# Patient Record
Sex: MALE | Race: WHITE | NOT HISPANIC OR LATINO | URBAN - METROPOLITAN AREA
[De-identification: names, ages, dates, MRNs, and addresses within clinical notes are randomized per-mention and may not be internally consistent; named-entity substitution may affect disease eponyms.]

---

## 2023-03-01 ENCOUNTER — TELEPHONE (OUTPATIENT)
Dept: TRANSPLANT | Facility: CLINIC | Age: 73
End: 2023-03-01

## 2023-04-28 ENCOUNTER — TELEPHONE (OUTPATIENT)
Dept: TRANSPLANT | Facility: CLINIC | Age: 73
End: 2023-04-28
Payer: MEDICARE

## 2023-05-03 DIAGNOSIS — Z76.82 ORGAN TRANSPLANT CANDIDATE: Primary | ICD-10-CM

## 2023-05-05 ENCOUNTER — TELEPHONE (OUTPATIENT)
Dept: TRANSPLANT | Facility: CLINIC | Age: 73
End: 2023-05-05
Payer: MEDICARE

## 2023-06-06 ENCOUNTER — HOSPITAL ENCOUNTER (OUTPATIENT)
Dept: RADIOLOGY | Facility: HOSPITAL | Age: 73
Discharge: HOME | End: 2023-06-06
Payer: MEDICARE

## 2023-06-06 ENCOUNTER — OFFICE VISIT (OUTPATIENT)
Dept: TRANSPLANT | Facility: CLINIC | Age: 73
End: 2023-06-06
Payer: MEDICARE

## 2023-06-06 VITALS
HEIGHT: 70 IN | BODY MASS INDEX: 28.15 KG/M2 | DIASTOLIC BLOOD PRESSURE: 80 MMHG | HEART RATE: 64 BPM | WEIGHT: 196.63 LBS | RESPIRATION RATE: 16 BRPM | SYSTOLIC BLOOD PRESSURE: 183 MMHG | TEMPERATURE: 97 F | OXYGEN SATURATION: 99 %

## 2023-06-06 DIAGNOSIS — Z76.82 ORGAN TRANSPLANT CANDIDATE: ICD-10-CM

## 2023-06-06 DIAGNOSIS — I25.10 CORONARY ARTERY DISEASE INVOLVING NATIVE CORONARY ARTERY OF NATIVE HEART WITHOUT ANGINA PECTORIS: ICD-10-CM

## 2023-06-06 DIAGNOSIS — Z71.85 IMMUNIZATION COUNSELING: ICD-10-CM

## 2023-06-06 DIAGNOSIS — E78.5 HYPERLIPIDEMIA, UNSPECIFIED HYPERLIPIDEMIA TYPE: ICD-10-CM

## 2023-06-06 DIAGNOSIS — Z01.818 PRE-TRANSPLANT EVALUATION FOR KIDNEY TRANSPLANT: Primary | ICD-10-CM

## 2023-06-06 DIAGNOSIS — E10.21 TYPE 1 DIABETES MELLITUS WITH DIABETIC NEPHROPATHY: ICD-10-CM

## 2023-06-06 DIAGNOSIS — I10 PRIMARY HYPERTENSION: ICD-10-CM

## 2023-06-06 DIAGNOSIS — N18.5 STAGE 5 CHRONIC KIDNEY DISEASE: ICD-10-CM

## 2023-06-06 PROBLEM — N18.9 ANEMIA IN CHRONIC KIDNEY DISEASE: Status: ACTIVE | Noted: 2021-01-14

## 2023-06-06 PROBLEM — D63.1 ANEMIA IN CHRONIC KIDNEY DISEASE: Status: ACTIVE | Noted: 2021-01-14

## 2023-06-06 PROBLEM — E03.9 HYPOTHYROIDISM: Status: ACTIVE | Noted: 2021-06-22

## 2023-06-06 PROBLEM — I50.32 CHRONIC DIASTOLIC CONGESTIVE HEART FAILURE: Status: ACTIVE | Noted: 2021-01-14

## 2023-06-06 PROCEDURE — 76770 US EXAM ABDO BACK WALL COMP: CPT | Mod: TC,TXP

## 2023-06-06 PROCEDURE — 99215 OFFICE O/P EST HI 40 MIN: CPT | Mod: PBBFAC,25,TXP | Performed by: NURSE PRACTITIONER

## 2023-06-06 PROCEDURE — 71046 XR CHEST PA AND LATERAL: ICD-10-PCS | Mod: 26,TXP,, | Performed by: RADIOLOGY

## 2023-06-06 PROCEDURE — 99244 PR OFFICE CONSULTATION,LEVEL IV: ICD-10-PCS | Mod: S$PBB,TXP,, | Performed by: SURGERY

## 2023-06-06 PROCEDURE — 99205 PR OFFICE/OUTPT VISIT, NEW, LEVL V, 60-74 MIN: ICD-10-PCS | Mod: S$PBB,TXP,, | Performed by: NURSE PRACTITIONER

## 2023-06-06 PROCEDURE — 72170 X-RAY EXAM OF PELVIS: CPT | Mod: 26,TXP,, | Performed by: RADIOLOGY

## 2023-06-06 PROCEDURE — 72170 X-RAY EXAM OF PELVIS: CPT | Mod: TC,TXP

## 2023-06-06 PROCEDURE — 99999 PR PBB SHADOW E&M-EST. PATIENT-LVL V: ICD-10-PCS | Mod: PBBFAC,TXP,, | Performed by: NURSE PRACTITIONER

## 2023-06-06 PROCEDURE — 71046 X-RAY EXAM CHEST 2 VIEWS: CPT | Mod: TC,TXP

## 2023-06-06 PROCEDURE — 76770 US RETROPERITONEAL COMPLETE: ICD-10-PCS | Mod: 26,TXP,, | Performed by: INTERNAL MEDICINE

## 2023-06-06 PROCEDURE — 97802 MEDICAL NUTRITION INDIV IN: CPT | Mod: PBBFAC,TXP

## 2023-06-06 PROCEDURE — 93978 US DOPP ILIACS BILATERAL: ICD-10-PCS | Mod: 26,TXP,, | Performed by: INTERNAL MEDICINE

## 2023-06-06 PROCEDURE — 99203 OFFICE O/P NEW LOW 30 MIN: CPT | Mod: S$PBB,TXP,, | Performed by: STUDENT IN AN ORGANIZED HEALTH CARE EDUCATION/TRAINING PROGRAM

## 2023-06-06 PROCEDURE — 99205 OFFICE O/P NEW HI 60 MIN: CPT | Mod: S$PBB,TXP,, | Performed by: NURSE PRACTITIONER

## 2023-06-06 PROCEDURE — 99999 PR PBB SHADOW E&M-EST. PATIENT-LVL V: CPT | Mod: PBBFAC,TXP,, | Performed by: NURSE PRACTITIONER

## 2023-06-06 PROCEDURE — 72170 XR PELVIS ROUTINE AP: ICD-10-PCS | Mod: 26,TXP,, | Performed by: RADIOLOGY

## 2023-06-06 PROCEDURE — 71046 X-RAY EXAM CHEST 2 VIEWS: CPT | Mod: 26,TXP,, | Performed by: RADIOLOGY

## 2023-06-06 PROCEDURE — 93978 VASCULAR STUDY: CPT | Mod: 26,TXP,, | Performed by: INTERNAL MEDICINE

## 2023-06-06 PROCEDURE — 93978 VASCULAR STUDY: CPT | Mod: TC,TXP

## 2023-06-06 PROCEDURE — 99244 OFF/OP CNSLTJ NEW/EST MOD 40: CPT | Mod: S$PBB,TXP,, | Performed by: SURGERY

## 2023-06-06 PROCEDURE — 76770 US EXAM ABDO BACK WALL COMP: CPT | Mod: 26,TXP,, | Performed by: INTERNAL MEDICINE

## 2023-06-06 PROCEDURE — 99203 PR OFFICE/OUTPT VISIT, NEW, LEVL III, 30-44 MIN: ICD-10-PCS | Mod: S$PBB,TXP,, | Performed by: STUDENT IN AN ORGANIZED HEALTH CARE EDUCATION/TRAINING PROGRAM

## 2023-06-06 RX ORDER — INSULIN LISPRO 100 [IU]/ML
10 INJECTION, SOLUTION INTRAVENOUS; SUBCUTANEOUS
COMMUNITY
Start: 2023-05-04

## 2023-06-06 RX ORDER — CALCITRIOL 0.25 UG/1
0.25 CAPSULE ORAL DAILY
COMMUNITY
Start: 2023-03-04

## 2023-06-06 RX ORDER — ASPIRIN 81 MG/1
81 TABLET ORAL DAILY
COMMUNITY

## 2023-06-06 RX ORDER — FUROSEMIDE 80 MG/1
80 TABLET ORAL DAILY
COMMUNITY
Start: 2023-05-11

## 2023-06-06 RX ORDER — INSULIN DETEMIR 100 [IU]/ML
16 INJECTION, SOLUTION SUBCUTANEOUS DAILY
COMMUNITY
Start: 2023-03-01

## 2023-06-06 RX ORDER — TAMSULOSIN HYDROCHLORIDE 0.4 MG/1
1 CAPSULE ORAL NIGHTLY
COMMUNITY
Start: 2023-03-20

## 2023-06-06 RX ORDER — BUPROPION HYDROCHLORIDE 150 MG/1
150 TABLET, EXTENDED RELEASE ORAL 2 TIMES DAILY
COMMUNITY
Start: 2022-08-09

## 2023-06-06 RX ORDER — AMLODIPINE BESYLATE 5 MG/1
5 TABLET ORAL DAILY
COMMUNITY
Start: 2023-05-03

## 2023-06-06 RX ORDER — FERROUS SULFATE 325(65) MG
325 TABLET ORAL DAILY
COMMUNITY

## 2023-06-06 RX ORDER — CARVEDILOL 25 MG/1
25 TABLET ORAL 2 TIMES DAILY
COMMUNITY
Start: 2023-02-24

## 2023-06-06 RX ORDER — LEVOTHYROXINE SODIUM 175 UG/1
175 TABLET ORAL DAILY
COMMUNITY
Start: 2023-05-11

## 2023-06-06 RX ORDER — ATORVASTATIN CALCIUM 40 MG/1
40 TABLET, FILM COATED ORAL NIGHTLY
COMMUNITY
Start: 2023-03-25

## 2023-06-06 NOTE — PROGRESS NOTES
PRE-TRANSPLANT INFECTIOUS DISEASE CONSULT    Reason for Visit:  Pre-transplant evaluation  Referring Provider: Dr. Krish Greene Batsheva*     History of Present Illness:    72 y.o. male with a history of ESRD 2/2 HTN,\and  DM2 presents for pre-kidney transplant evaluation.    Infectious History:  Recent hospital admissions: No  Recent infections: No  Recent or current antibiotic use: No  History of recurrent infections *(sinus / pneumonia / UTI / SBP)*: No  History of diabetic foot wound or bone/joint infection: No  Recent dental infections, issues or procedures: No  History of chicken pox: No  History of shingles: No  History of STI: No  History of COVID infection: Yes    History of Immunosuppression:  Prior chemotherapy / immunosuppression: No  Prior transplant: No  History of splenectomy: No    Tuberculosis:  Prior screening for latent TB: No  Prior diagnosis of latent TB: No  Risk factors for TB *known exposure, incarceration, homelessness*: No    Geographical exposures:  Currently lives in [place] with wife.   Lived in the following states: [place], [place], [place]  Lived or travelled to the [facility] [place]: No  International travel: Yes; [place], [place], [place].  Travel-associated illness: No    Social/Environmental:  Occupation:  Retired, restaurant/hospitality.  Pets: No   Livestock: No  Fishing / hunting: Yes; occasionally.  Hobbies: relaxing, reading.  Water: [place] water  Consumption of raw/undercooked meat or seafood?  Yes, raw oysters.  Tobacco: No  Alcohol: No  Recreational drug use:  No  Sexual partners: Wife,  for 51yrs.      Past Histories:   Past Medical History:   Diagnosis Date    Anemia     Bladder outflow obstruction     CHF (congestive heart failure)     Coronary artery disease     Diabetes mellitus     Diabetes mellitus type I     Disorder of kidney and ureter     Hyperkalemia     Hyperlipidemia     Hypertension     Hypo-osmolality and hyponatremia     Hyponatremia     Hypothyroidism, unspecified      Past  Surgical History:   Procedure Laterality Date    CORONARY ANGIOPLASTY       Family History   Problem Relation Age of Onset    Hypertension Mother     Diabetes Father      Social History     Tobacco Use    Smoking status: Former     Types: Cigarettes     Quit date:      Years since quittin.4    Smokeless tobacco: Never   Substance Use Topics    Alcohol use: Not Currently    Drug use: Never     Review of patient's allergies indicates:  No Known Allergies      Immunization History:  Received all childhood vaccines: Yes  All household members receive annual flu vaccine: Yes  All household members are up to date on COVID vaccine: Yes      Current antibiotics:  Antibiotics (From admission, onward)      None              Review of Systems  Review of Systems   Constitutional: Negative.   All other systems reviewed and are negative.       Objective  Physical Exam  Vitals reviewed.   Constitutional:       General: He is not in acute distress.     Appearance: He is normal weight. He is not ill-appearing or diaphoretic.   HENT:      Nose: No congestion.   Eyes:      Conjunctiva/sclera: Conjunctivae normal.   Cardiovascular:      Rate and Rhythm: Normal rate.   Pulmonary:      Effort: No respiratory distress.   Skin:     General: Skin is warm and dry.      Findings: No rash.   Neurological:      Mental Status: He is alert and oriented to person, place, and time. Mental status is at baseline.   Psychiatric:         Behavior: Behavior normal.         Thought Content: Thought content normal.         Labs:    CBC:   Lab Results   Component Value Date    WBC 7.50 2023    HGB 10.0 (L) 2023    HCT 30.8 (L) 2023    MCV 92 2023     2023    GRAN 5.5 2023    GRAN 73.2 (H) 2023    LYMPH 0.8 (L) 2023    LYMPH 10.7 (L) 2023    MONO 0.9 2023    MONO 11.6 2023    EOSINOPHIL 3.3 2023       Syphilis screening:   Lab Results   Component Value Date    RPR  Reactive (A) 06/06/2023    PRPQ 1:1 (A) 06/06/2023    FTA-Abs: Negative (06/06/23).    TB screening: No results found for: TBGOLDPLUS, TSPOTSCREN    HIV screening:   Lab Results   Component Value Date    [id number] Non-reactive 06/06/2023       Strongyloides IgG: No results found for: STRONGANTIGG    Hepatitis Serologies:   Lab Results   Component Value Date    HEPAIGG Non-reactive 06/06/2023    HEPBCAB Non-reactive 06/06/2023    HEPCAB Non-reactive 06/06/2023        Varicella IgG:   Lab Results   Component Value Date    VARICELLAINT Positive 06/06/2023         Immunization History   Administered Date(s) Administered    COVID-19, MRNA, LN-S, PF (Pfizer) (Purple Cap) 01/25/2021, 02/15/2021    Influenza - High Dose - PF (65 years and older) 10/23/2017, 09/24/2019    Influenza - Quadrivalent - High Dose - PF (65 years and older) 10/16/2020    Influenza - Quadrivalent - PF *Preferred* (6 months and older) 10/20/2015    Influenza - Trivalent - PF (ADULT) 10/24/2011, 10/24/2014          Assessment and Plan    1. Risks of Infection: Available serologies were reviewed. No unusual risks of infection or significant barriers to transplantation were identified from the infectious disease standpoint given the information available at this time.    - Acute infectious issues: None;       RPR 1:1 and f/u confirmatory FTA-Abs negative. --this is c/w false positive RPR titer, and rules-out past or active syphilis. No further action or abx treated indicated / recommended.     - Pending serologies: Hepatitis B surface Ab, Hepatitis B surface Ag, Quantiferon gold / T-spot, and Strongyloides IgG; FTA-Abs Igm/IgG( added on).   - Please call if any pending serologic testing is positive.    2. Immunizations:  Based on the patient's immunization history and serologies, the following immunizations are recommended:  - Hepatitis A    Patient does not have immunity to hepatitis A    Vaccination ordered today: Yes   - Hepatitis B    Patient does  not have immunity to hepatitis B    Vaccination ordered today: Yes   - COVID    Current CDC vaccination recommendations were discussed with the patient   - Annual high dose influenza     Vaccination ordered today: No. Reason for not ordering: vaccination up to date   - Prevnar 20    Vaccination ordered today: Yes   - Tdap    Vaccination ordered today: Yes   - Shingrix    Vaccination ordered today: Yes    Recommended Pre-Transplant Immunization Schedule   Vaccine  0m 1m 2m 6m   Pneumococcal conjugate vaccine (Prevnar 20) X      Tetanus-diphtheria-pertussis (Tdap)* X      Hepatitis A Vaccine (Havrix)** X   X   Hepatitis B Vaccine (Heplisav)** X X     Influenza (annual) X      Zoster Recombinant Vaccine (Shingrix) X  X           *Administer booster every 10 years.       **Administer if no immunity demonstrated on serologies               Patient will receive vaccines at local pharmacy. A written prescription was provided for all vaccine doses.     3. Counseling:   I discussed with the patient the risk for increased susceptibility to infections following transplantation including increased risk for infection right after transplant and if rejection should occur.  The patient has been counseled on the importance of vaccinations to decrease risk of infection and severe illness. Specific guidance has been provided to the patient regarding the patient's occupation, hobbies and activities to avoid future infectious complications.     4. Transplant Candidacy: Based on available information, there are no identified significant barriers to transplantation from an infectious disease standpoint.  Final determination of transplant candidacy will be made once evaluation is complete and reviewed by the Selection Committee.      Follow up with infectious disease as needed.       The total time for evaluation and management services performed on 06/06/2023 was greater than 30 minutes.

## 2023-06-06 NOTE — PROGRESS NOTES
INITIAL PATIENT EDUCATION NOTE    Mr. Peewee Aviles III was seen in pre-kidney transplant clinic for evaluation for kidney, kidney/pancreas or pancreas only transplant.  The patient attended a an individual video education session that discussed/reviewed the following aspects of transplantation: evaluation including diagnostic and laboratory testing,( Chemistries, Hematology, Serologies including HIV and Hepatitis and HLA) required for transplantation and selection committee process, UNOS waitlist management/multiple listings, types of organs offered (KDPI < 85%, KDPI > 85%, PHS risk, DCD, HCV+, HIV+ for HIV+ recipients and enbloc/dual), financial aspects, surgical procedures, dietary instruction pre- and post-transplant, health maintenance pre- and post-transplant, post-transplant hospitalization and outpatient follow-up, potential to participate in a research protocol, and medication management and side effects.  A question and answer session was provided after the presentation.    The patient was seen by all members of the multi-disciplinary team to include: Nephrologist/JANET, Surgeon, , Transplant Coordinator, , Pharmacist and Dietician (if applicable).    The patient reviewed and signed all consents for evaluation which were witnessed and sent to scanning [facility] the [facility].    The patient was given an education book and plan for further evaluation based on his individual assessment.      Reviewed program requirement for complete COVID vaccination with documentation prior to listing.  COVID education information reviewed with patient. Patient encouraged to be up to date on all vaccinations.       The patient was informed that the transplant team would manage immediate post op pain. If the patient requires long term pain management, they will need to have that pain management addressed by their PCP or previous provider who wrote for long term pain medicines.    The patient was  encouraged to call with any questions or concerns.INITIAL PATIENT EDUCATION NOTE    Mr. Peewee Aviles III was seen in pre-kidney transplant clinic for evaluation for kidney, kidney/pancreas or pancreas only transplant.  The patient attended a an individual video education session that discussed/reviewed the following aspects of transplantation: evaluation including diagnostic and laboratory testing,( Chemistries, Hematology, Serologies including HIV and Hepatitis and HLA) required for transplantation and selection committee process, UNOS waitlist management/multiple listings, types of organs offered (KDPI < 85%, KDPI > 85%, PHS risk, DCD, HCV+, HIV+ for HIV+ recipients and enbloc/dual), financial aspects, surgical procedures, dietary instruction pre- and post-transplant, health maintenance pre- and post-transplant, post-transplant hospitalization and outpatient follow-up, potential to participate in a research protocol, and medication management and side effects.  A question and answer session was provided after the presentation.    The patient was seen by all members of the multi-disciplinary team to include: Nephrologist/JANET, Surgeon, , Transplant Coordinator, , Pharmacist and Dietician (if applicable).    The patient reviewed and signed all consents for evaluation which were witnessed and sent to scanning into the [facility] chart.    The patient was given an education book and plan for further evaluation based on his individual assessment.      Reviewed program requirement for complete COVID vaccination with documentation prior to listing.  COVID education information reviewed with patient. Patient encouraged to be up to date on all vaccinations.       The patient was informed that the transplant team would manage immediate post op pain. If the patient requires long term pain management, they will need to have that pain management addressed by their PCP or previous provider who wrote  for long term pain medicines.    The patient was encouraged to call with any questions or concerns.

## 2023-06-06 NOTE — Clinical Note
June 8, 2023        Krish Paniagua Jr.  [address] [place] [address] [address]  [address]  [place] [address]  Phone: [phone]  Fax: [phone]             [facility]- Transplant 1st Fl  [address]  [facility] [address]  Phone: [phone]   Patient: Peewee Aviles III   MR Number: [id number]   YOB: 1950   Date of Visit: 6/6/2023       Dear Dr. Krish Paniagua Jr.    Thank you for referring Peewee Aviles to me for evaluation. Attached you will find relevant portions of my assessment and plan of care.    If you have questions, please do not hesitate to call me. I look forward to following Peewee Aviles along with you.    Sincerely,    Lashon Diallo, NP    Enclosure    If you would like to receive this communication electronically, please contact [email or web address] or [phone] to request [facility] access.    [facility] is a tool which provides read-only access to select patient information with whom you have a relationship. Its easy to use and provides real time access to review your patients record including encounter summaries, notes, results, and demographic information.    If you feel you have received this communication in error or would no longer like to receive these types of communications, please e-mail [email or web address]

## 2023-06-06 NOTE — PROGRESS NOTES
TRANSPLANT NUTRITIONAL ASSESSMENT    Referring Provider: Krish Paniagua Jr, MD     Reason for Visit: Pre-kidney transplant work-up (pre-dialysis)    Age: 72 y.o.  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     Past Medical History:   Diagnosis Date    Anemia     Bladder outflow obstruction     CHF (congestive heart failure)     Coronary artery disease     Diabetes mellitus     Diabetes mellitus type I     Disorder of kidney and ureter     Hyperkalemia     Hyperlipidemia     Hypertension     Hypo-osmolality and hyponatremia     Hyponatremia     Hypothyroidism, unspecified      Lab Results   Component Value Date     (H) 06/06/2023    K 4.1 06/06/2023    PHOS 6.2 (H) 06/06/2023    CHOL 109 (L) 06/06/2023    HDL 37 (L) 06/06/2023    TRIG 91 06/06/2023    ALBUMIN 3.8 06/06/2023    HGBA1C 7.0 (H) 06/06/2023    CALCIUM 9.8 06/06/2023     Other Pertinent Labs: N/A    Current Outpatient Medications   Medication Sig    amLODIPine (NORVASC) 5 MG tablet Take 5 mg by mouth once daily.    aspirin (ECOTRIN) 81 MG EC tablet Take 81 mg by mouth once daily.    atorvastatin (LIPITOR) 40 MG tablet Take 40 mg by mouth every evening.    buPROPion (WELLBUTRIN SR) 150 MG TBSR 12 hr tablet Take 150 mg by mouth 2 (two) times a day.    calcitRIOL (ROCALTROL) 0.25 MCG Cap Take 0.25 mcg by mouth once daily.    carvediloL (COREG) 25 MG tablet Take 25 mg by mouth 2 (two) times daily.    d-mannose 500 mg Cap Take 500 mg by mouth once daily.    ferrous sulfate (FEOSOL) 325 mg (65 mg iron) Tab tablet Take 325 mg by mouth once daily.    furosemide (LASIX) 80 MG tablet Take 80 mg by mouth once daily.    HUMALOG KWIKPEN INSULIN 100 unit/mL pen Inject 10 Units into the skin 3 (three) times daily with  "meals.    LEVEMIR FLEXPEN 100 unit/mL (3 mL) InPn pen Inject 16 Units into the skin once daily.    levothyroxine (SYNTHROID, LEVOTHROID) 175 MCG tablet Take 175 mcg by mouth once daily.    tamsulosin (FLOMAX) 0.4 mg Cap Take 1 capsule by mouth every evening.     No current facility-administered medications for this visit.     Allergies: Patient has no known allergies.    Ht Readings from Last 1 Encounters:   06/06/23 5' 10.08" (1.78 m)     Wt Readings from Last 1 Encounters:   06/06/23 89.2 kg (196 lb 10.4 oz)      BMI: Body mass index is 28.15 kg/m².    Usual Weight: 200 lb  Weight Change/Time: stable   Current Diet: low sodium   Appetite/Current Intake: fair   Exercise/Physical Activity: functional in ADLs   Nutritional/Herbal Supplements: none  Chewing/Swallowing Problems: none  Symptoms: none    Estimated Kcal Need: 2239-5455 kcal/dy (20-25 kcal/kg)   Estimated Protein Need: 71-89 gm/day (0.8-1.0 gm/kg)     Nutritional History:   Pt, spouse, and family member present. Pt reports previous renal diet education in the past year. Pt typically consumes 2 vegetables with dinner meal.    Diet Recall    Morning: cheerios with 2% milk, black coffee    Midday: ham or turkey or tomato sandwich with whole wheat bread and side of potato salad, slaw, or fruit     Evening: meat (steak, roast, chicken) and vegetables (green beans, squash, peas, broccoli). Salad 3x/wk (maury, tomato, bell peppers, cucumber, oil and vinegar)    Snacks: fruit (banana, plum, orange, apples, berries, watermelon), peanut butter, low sodium popcorn     Desserts: none    Beverages: 4 16.9 oz bottled water/day, unsweet tea, sprite, orange juice when sugar is low       Seasonings: "light" salt, pepper, garlic     Restaurants/Fast Food: 1-3x/wk       Nutritional Diagnoses  Problem: food- and nutrition-related knowledge deficit  Etiology: re-enforcement on pre-kidney transplant nutrition recommendations  Symptoms: questions from pt    Educational Need? " yes  Barriers: none identified  Discussed with: patient and spouse, family member   Interventions: Patient taught nutrition information regarding Pre-kidney transplant work-up (pre-dialysis). Renal Nutrition Therapy packet reviewed (high/low food sources of K, Phos and protein, low sodium and fluid intake, emphasis on moderate protein intake). Encouraged physical activity daily, regular exercise as tolerated, stay mobile.  Goals/Recommendations: diet adherence  Actions Taken: instruct/provide written information  Patient and/or family comprehend instructions: yes  Outcome: Verbalizes understanding  Monitoring: Contact information provided, will f/u in clinic and communicate with the care team as needed.     Counseling Time: 20 minutes

## 2023-06-06 NOTE — PROGRESS NOTES
PHARM.D. PRE-TRANSPLANT NOTE:    This patient's medication therapy was evaluated as part of his pre-transplant evaluation.      The following general pharmacologic concerns were noted: aspirin 81 mg hold perioperatively     The following concerns for post-operative pain management were noted: none    The following pharmacologic concerns related to HCV therapy were noted: none      This patient's medication profile was reviewed for considerations for DAA Hepatitis C therapy:    [x]  No current inducers of CYP 3A4 or PGP  [x]  No amiodarone on this patient's EMR profile in the last 24 months  [x]  No past or current atrial fibrillation on this patient's EMR profile       Current Outpatient Medications   Medication Sig Dispense Refill    amLODIPine (NORVASC) 5 MG tablet Take 5 mg by mouth once daily.      aspirin (ECOTRIN) 81 MG EC tablet Take 81 mg by mouth once daily.      atorvastatin (LIPITOR) 40 MG tablet Take 40 mg by mouth every evening.      buPROPion (WELLBUTRIN SR) 150 MG TBSR 12 hr tablet Take 150 mg by mouth 2 (two) times a day.      calcitRIOL (ROCALTROL) 0.25 MCG Cap Take 0.25 mcg by mouth once daily.      carvediloL (COREG) 25 MG tablet Take 25 mg by mouth 2 (two) times daily.      d-mannose 500 mg Cap Take 500 mg by mouth once daily.      ferrous sulfate (FEOSOL) 325 mg (65 mg iron) Tab tablet Take 325 mg by mouth once daily.      furosemide (LASIX) 80 MG tablet Take 80 mg by mouth once daily.      HUMALOG KWIKPEN INSULIN 100 unit/mL pen Inject 10 Units into the skin 3 (three) times daily with meals.      LEVEMIR FLEXPEN 100 unit/mL (3 mL) InPn pen Inject 16 Units into the skin once daily.      levothyroxine (SYNTHROID, LEVOTHROID) 175 MCG tablet Take 175 mcg by mouth once daily.      tamsulosin (FLOMAX) 0.4 mg Cap Take 1 capsule by mouth every evening.       No current facility-administered medications for this visit.           I am available for consultation and can be contacted, as needed by the  other members of the Transplant team.

## 2023-06-06 NOTE — PROGRESS NOTES
Transplant Surgery  Kidney Transplant Recipient Evaluation    Referring Physician: Krish Paniagua Jr.  Current Nephrologist: Krish Paniagua Jr.    Subjective:     Reason for Visit: evaluate transplant candidacy    History of Present Illness: Peewee Aviles is a 72 y.o. year old male undergoing transplant evaluation.    Dialysis History: Peewee is pre-dialysis.      Transplant History: N/A    Etiology of Renal Disease: Diabetes Mellitus - Type I (based on medical records from referral).    External provider notes reviewed: No    Review of Systems   Constitutional:  Negative for fatigue.   HENT:  Negative for drooling, postnasal drip and sore throat.    Eyes:  Negative for discharge and itching.   Respiratory:  Negative for choking and stridor.    Gastrointestinal:  Negative for rectal pain.   Endocrine: Negative for polydipsia.   Genitourinary:  Negative for enuresis and genital sores.   Musculoskeletal:  Negative for back pain, neck pain and neck stiffness.   Allergic/Immunologic: Negative for immunocompromised state.   Neurological:  Negative for facial asymmetry and numbness.   Hematological:  Negative for adenopathy.   Psychiatric/Behavioral:  Negative for behavioral problems, self-injury and suicidal ideas.    Objective:     Physical Exam:  Constitutional:   Vitals reviewed: yes   Well-nourished and well-groomed: yes  Eyes:   Sclerae icteric: no   Extraocular movements intact: yes  GI:    Bowel sounds normal: yes   Tenderness: no    If yes, quadrant/location: not applicable   Palpable masses: no    If yes, quadrant/location: not applicable   Hepatosplenomegaly: no   Ascites: no   Hernia: no    If yes, type/location: not applicable   Surgical scars: no    If yes, type/location: not applicable  Resp:   Effort normal: yes   Breath sounds normal: yes    CV:   Regular rate and rhythm: yes   Heart sounds normal: yes   Femoral pulses normal: yes   Extremities edematous: no  Skin:   Rashes or lesions  present: no    If yes, describe:not applicable   Jaundice:: no    Musculoskeletal:   Gait normal: yes   Strength normal: yes  Psych:   Oriented to person, place, and time: yes   Affect and mood normal: yes    Additional comments: not applicable    Diagnostics:  The following labs have been reviewed: CBC  CMP    Counseling: We provided Peewee Aviles III with a group education session today.  We discussed kidney transplantation at length with him, including risks, potential complications, and alternatives in the management of his renal failure.  The discussion included complications related to anesthesia, bleeding, infection, primary nonfunction, and ATN.  I discussed the typical postoperative course, length of hospitalization, the need for long-term immunosuppression, and the need for long-term routine follow-up.  I discussed living-donor and -donor transplantation and the relative advantages and disadvantages of each.  I also discussed average waiting times for both living donation and  donation.  I discussed national and center-specific survival rates.  I also mentioned the potential benefit of multicenter listing to candidates listed with centers within more than one organ procurement organization.  All questions were answered.    Patient advised that it is recommended that all transplant candidates, and their close contacts and household members receive Covid vaccination.    Final determination of transplant candidacy will be made once evaluation is complete and reviewed by the Kidney & Kidney/Pancreas Selection Committee.    Coronavirus disease (COVID-19) caused by severe acute respiratory virus coronavirus 2 (SARS-C0V 2) is associated with increased mortality in solid organ transplant recipients (SOT) compared to non-transplant patients. Vaccine responses to vaccination are depressed against SARS-CoV2 compared to normal individuals but improve with third vaccination doses. Vaccination prior to  SOT provides both the best opportunity for transplant candidates to develop protective immunity and to reduce the risk of serious COVID19 infections post transplantation. Organ transplant candidates at [facility] will be required to receive SARS-CoV-2 vaccination prior to being listed with a an active status, whenever possible. Exceptions will be made for disability related reasons or for sincerely held [employer] beliefs.          Transplant Surgery - Candidacy   Assessment/Plan:   Peewee Aviles III is pre-dialysis with CKD stage 4 (GFR 15-29 mL/min). I see no surgical contraindication to placing a kidney transplant. Based on available information, Peewee Aviles III is a suitable kidney transplant candidate.   The patient understands the risks outweigh the benefits for combined simultaneous kidney pancreas transplant.    Additional testing to be completed according to the Written Order Guidelines for Adult Pre-kidney and Pancreas Transplant Evaluation (KI-02).  Interpretation of tests and discussion of patient management involves all members of the multidisciplinary transplant team.    Paul Miranda MD

## 2023-06-06 NOTE — PROGRESS NOTES
Transplant Nephrology  Kidney Transplant Recipient Evaluation    Referring Physician: Kirsh Paniagua Jr.  Current Nephrologist: Krish Paniagua Jr.    Subjective:   CC:  Initial evaluation of kidney transplant candidacy.    HPI:  Mr. Aviles is a 72 y.o. year old White male who has presented to be evaluated as a potential kidney transplant recipient.  He has advanced kidney disease secondary to diabetic nephropathy.  Patient is currently pre-dialysis, does not have dialysis access.    DM 1 since age 15. Does have peripheral neuropathy, denies gastroparesis or retinopathy. On insulin, A1c today 7.0.    CAD s/p PCI and stenting in 2020. Follows with local cardiologist Dr. aMnning every 6 months. CVA about 15 years ago, no residual deficits.     BPH with bladder outlet obstruction-stable on flomax, no problems passing urine or emptying bladder.    Has several family members interested in donation.    He remains active, does work in the yard. Does not use any assistive devices. Able to get on and off exam table without assistance. Was able to walk up and down clinic hallway with me unassisted with steady gait.    Last colonoscopy done at [facility].      Current Outpatient Medications   Medication Sig Dispense Refill    amLODIPine (NORVASC) 5 MG tablet Take 5 mg by mouth once daily.      aspirin (ECOTRIN) 81 MG EC tablet Take 81 mg by mouth once daily.      atorvastatin (LIPITOR) 40 MG tablet Take 40 mg by mouth every evening.      buPROPion (WELLBUTRIN SR) 150 MG TBSR 12 hr tablet Take 150 mg by mouth 2 (two) times a day.      calcitRIOL (ROCALTROL) 0.25 MCG Cap Take 0.25 mcg by mouth once daily.      carvediloL (COREG) 25 MG tablet Take 25 mg by mouth 2 (two) times daily.      d-mannose 500 mg Cap Take 500 mg by mouth once daily.      ferrous sulfate (FEOSOL) 325 mg (65 mg iron) Tab tablet Take 325 mg by mouth once daily.      furosemide (LASIX) 80 MG tablet Take 80 mg by mouth once daily.       HUMALOG KWIKPEN INSULIN 100 unit/mL pen Inject 10 Units into the skin 3 (three) times daily with meals.      LEVEMIR FLEXPEN 100 unit/mL (3 mL) InPn pen Inject 16 Units into the skin once daily.      levothyroxine (SYNTHROID, LEVOTHROID) 175 MCG tablet Take 175 mcg by mouth once daily.      tamsulosin (FLOMAX) 0.4 mg Cap Take 1 capsule by mouth every evening.       No current facility-administered medications for this visit.       Past Medical History:   Diagnosis Date    Anemia     Bladder outflow obstruction     CHF (congestive heart failure)     Coronary artery disease     Diabetes mellitus     Diabetes mellitus type I     Disorder of kidney and ureter     Hyperkalemia     Hyperlipidemia     Hypertension     Hypo-osmolality and hyponatremia     Hyponatremia     Hypothyroidism, unspecified      Past Medical and Surgical History: Mr. Aviles  has a past medical history of Anemia, Bladder outflow obstruction, CHF (congestive heart failure), Coronary artery disease, Diabetes mellitus, Diabetes mellitus type I, Disorder of kidney and ureter, Hyperkalemia, Hyperlipidemia, Hypertension, Hypo-osmolality and hyponatremia, Hyponatremia, and Hypothyroidism, unspecified.  He has a past surgical history that includes Coronary angioplasty.    Past Social and Family History: Mr. Aviles reports that he quit smoking about 39 years ago. His smoking use included cigarettes. He has never used smokeless tobacco. He reports that he does not currently use alcohol. He reports that he does not use drugs. His family history includes Diabetes in his father; Hypertension in his mother.    Review of Systems   Constitutional:  Positive for fatigue. Negative for activity change, appetite change and fever.   HENT:  Negative for congestion, mouth sores and sore throat.    Eyes:  Negative for visual disturbance.   Respiratory:  Negative for cough, chest tightness and shortness of breath.    Cardiovascular:  Positive for leg swelling. Negative for  "chest pain and palpitations.   Gastrointestinal:  Negative for abdominal distention, abdominal pain, constipation, diarrhea and nausea.   Genitourinary:  Negative for difficulty urinating, frequency and hematuria.   Musculoskeletal:  Negative for arthralgias and gait problem.   Skin:  Negative for wound.   Allergic/Immunologic: Negative for environmental allergies, food allergies and immunocompromised state.   Neurological:  Negative for dizziness, weakness and numbness.   Psychiatric/Behavioral:  Negative for sleep disturbance. The patient is not nervous/anxious.      Objective:   Blood pressure (!) 183/80, pulse 64, temperature 97.3 °F (36.3 °C), temperature source Temporal, resp. rate 16, height 5' 10.08" (1.78 m), weight 89.2 kg (196 lb 10.4 oz), SpO2 99 %.body mass index is 28.15 kg/m².    Physical Exam  Vitals and nursing note reviewed.   Constitutional:       Appearance: Normal appearance.   HENT:      Head: Normocephalic.   Cardiovascular:      Rate and Rhythm: Normal rate and regular rhythm.      Heart sounds: Normal heart sounds.   Pulmonary:      Effort: Pulmonary effort is normal.      Breath sounds: Normal breath sounds.   Abdominal:      General: Bowel sounds are normal. There is no distension.      Palpations: Abdomen is soft.      Tenderness: There is no abdominal tenderness.   Musculoskeletal:         General: Normal range of motion.   Skin:     General: Skin is warm and dry.   Neurological:      General: No focal deficit present.      Mental Status: He is alert.   Psychiatric:         Behavior: Behavior normal.       Labs:  No results found for: WBC, HGB, HCT, LABPLAT, NA, K, CL, CO2, BUN, CREATININE, EGFRNORACEVR, GLUCOSE, CALCIUM, PHOS, MG, ALBUMIN, AST, ALT, QMEI158YC1, UTPCR, PTH, TACROLIMUS, CYCLOSPORINE, SIROLIMUSLEV    No results found for: PREALBUMIN, BILIRUBINUA, GGT, AMYLASE, LIPASE, PROTEINUA, NITRITE, RBCUA, WBCUA    No results found for: HLAABCTYPE    Labs were reviewed with the " patient.    Assessment:     1. Pre-transplant evaluation for kidney transplant    2. Type 1 diabetes mellitus with diabetic nephropathy    3. Stage 5 chronic kidney disease    4. Primary hypertension    5. Coronary artery disease involving native coronary artery of native heart without angina pectoris    6. Hyperlipidemia, unspecified hyperlipidemia type    7. BMI 28.0-28.9,adult        Plan:   72 y.o. year old White male who has presented to be evaluated as a potential kidney transplant recipient.  He has advanced kidney disease secondary to diabetic nephropathy.  Patient is currently pre-dialysis. Will obtain cardiac testing from Dr. Manning at [facility] as well as colonoscopy records from [facility]. I strongly encouraged living donation, he has several family members that have expressed interest. Functional status acceptable at this time, he understands in the future if he were to decline that he may no longer be a suitable candidate.       Transplant Candidacy:   Based on available information, Mr. Aviles is a suitable kidney transplant candidate.   Meets center eligibility for accepting HCV+ donor offer - Yes.  Patient educated on HCV+ donors. Peewee is willing to accept HCV+ donor offer - Yes   Patient is a candidate for KDPI > 85 kidney donor offer - No (weight > 88 kg).  Final determination of transplant candidacy will be made once workup is complete and reviewed by the selection committee.    Patient advised that it is recommended that all transplant candidates, and their close contacts and household members receive Covid vaccination.    UNOS Patient Status  Functional Status: 90% - Able to carry on normal activity: minor symptoms of disease    Lashon Diallo NP

## 2023-06-20 ENCOUNTER — PATIENT MESSAGE (OUTPATIENT)
Dept: TRANSPLANT | Facility: CLINIC | Age: 73
End: 2023-06-20
Payer: MEDICARE

## 2023-06-27 ENCOUNTER — PATIENT MESSAGE (OUTPATIENT)
Dept: TRANSPLANT | Facility: CLINIC | Age: 73
End: 2023-06-27
Payer: MEDICARE

## 2023-07-17 NOTE — PROGRESS NOTES
Transplant Recipient Adult Psychosocial Assessment    SW completed assessment with patient, pt's wife Bennie Aviles, and pt's daughter Hollie Gordon in clinic.    Peewee Aviles  [address] [facility] [address]  Telephone Information:   Mobile [phone]   Home  [phone] (home)  Work  There is no work phone number on file.  E-mail  [email or web address]    Sex: male  YOB: 1950  Age: 72 y.o.    Encounter Date: 2023  [place] Citizen: yes  Primary Language: English   Needed: no    Emergency Contact:  Name: Bennie Aviles  Relationship: wife  Address: same as patient  Phone Numbers: [phone] (mobile)    Family/Social Support:   Number of dependents/: none  Marital history: Pt has been  to wife Bennie for 51 years.  Other family dynamics: Pt's father is  and mother is 92 yo living next door to pt.  Pt has 2 sisters and 2 brothers: 1 lives in [place], [place], 1 lives in [place], [place], and 2 live in [place], [place].  Pt has 3 adult children: Hollie Haynes living in [place], [place]; Brayden Aviles living in [place], [place]; Leta Smyth living in [place], [place].    Household Composition:  Name: Peewee Aviles III  Age: 72  Relationship: patient  Does person drive? yes    Name: Bennie Aviles  Age: 73  Relationship: wife  Does person drive? yes    Do you and your caregivers have access to reliable transportation? yes  PRIMARY CAREGIVER: Bennie Aviles, pt's wife, will be primary caregiver, phone number [phone].      provided in-depth information to patient and caregiver regarding pre- and post-transplant caregiver role.   strongly encourages patient and caregiver to have concrete plan regarding post-transplant care giving, including back-up caregiver(s) to ensure care giving needs are met as needed.    Patient and Caregiver states understanding all aspects of caregiver role/commitment and is able/willing/committed to being caregiver to the fullest extent necessary.    Patient  and Caregiver verbalizes understanding of the education provided today and caregiver responsibilities.         remains available. Patient and Caregiver agree to contact  in a timely manner if concerns arise.      Able to take time off work without financial concerns:  N/A - Bennie does not work .     Additional Significant Others who will Assist with Transplant:  Name: Hollie KAUR confirmed caregiver availability in-person during assessment.  Age: 42  City: [place] State: [place]  Relationship: daughter  Does person drive? yes    Living Will: no  Healthcare Power of : no  Advance Directives on file: <<no information> per medical record.  Verbally reviewed LW/HCPA information.   provided patient with copy of LW/HCPA documents and provided education on completion of forms.    Living Donors: No. Education and resource information given to patient.  Patient reports family interested in donation.    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  Pt reports retiring 4 years ago.    Spouse/Significant Other Employment: Pt's wife Bennie does not work.    Disabled: no    Monthly Income:  Other: $undisclosed - pt/family deny any concerns regarding past financial difficulties and deny anticipating any future financial difficulties  Able to afford all costs now and if transplanted, including medications: yes  Patient and Caregiver verbalizes understanding of personal responsibilities related to transplant costs and the importance of having a financial plan to ensure that patients transplant costs are fully covered.      provided  fundraising information/education.  Patient and Caregiver verbalizes understanding.   remains available.    Insurance:   Payer/Plan Subscr  Sex Relation Sub. Ins. ID Effective Group Num   1. MEDICARE - ME* AGATA DELGADO III 1950 Male Self [id number] 12/1/15                                    [address]   2. BLUE CROSS BL* AGATA DELGADO III 1950 Male Self [id number] 20 [id number]                                   [address] [address]     Primary Insurance (for UNOS reporting): Public Insurance - Medicare FFS (Fee For Service)  Secondary Insurance (for UNOS reporting): Private Insurance - BCBS  Patient and Caregiver verbalizes clear understanding that patient may experience difficulty obtaining and/or be denied insurance coverage post-surgery. This includes and is not limited to disability insurance, life insurance, health insurance, burial insurance, long term care insurance, and other insurances.    Patient and Caregiver also reports understanding that future health concerns related to or unrelated to transplantation may not be covered by patient's insurance.  Resources and information provided and reviewed.      Patient and Caregiver provides verbal permission to release any necessary information to outside resources for patient care and discharge planning.  Resources and information provided are reviewed.      Dialysis Adherence:  Patient reports currently being pre-dialysis and maintaining full adherence to nephrology plan of care.  Pt reports nephrologist is Dr. Paniagua in [place], [place].  Per NATASHA's review of nephrology visit notes in chart, no concerns regarding pt's treatment compliance noted over the past 3 months.    Infusion Service: patient utilizing? no  Home Health: patient utilizing? no  DME: yes - CPAP  Pulmonary/Cardiac Rehab: no   ADLS:  Pt reports being independent with all ADLs and mobility and driving himself.    Adherence:   Pt/caregiver reports pt has exhibited good  "adherence to medication regimen, appointment schedule, and medical recommendations in the past.  Pt states his wife voluntarily offers assistance with medication management.  Adherence education and counseling provided.     Per History Section:  Past Medical History:   Diagnosis Date    Anemia     Bladder outflow obstruction     CHF (congestive heart failure)     Coronary artery disease     Diabetes mellitus     Diabetes mellitus type I     Disorder of kidney and ureter     Hyperkalemia     Hyperlipidemia     Hypertension     Hypo-osmolality and hyponatremia     Hyponatremia     Hypothyroidism, unspecified      Social History     Tobacco Use    Smoking status: Former     Types: Cigarettes     Quit date:      Years since quittin.5    Smokeless tobacco: Never   Substance Use Topics    Alcohol use: Not Currently     Social History     Substance and Sexual Activity   Drug Use Never     Social History     Substance and Sexual Activity   Sexual Activity Not Currently       Per Today's Psychosocial:  Tobacco: none, patient denies any use.  Pt reports quitting cigarette smoking in  and smoked cigars and pipe tobacco in .  Pt discontinued tobacco use since .  Alcohol: none, patient denies any use.  Illicit Drugs/Non-prescribed Medications: none, patient denies any use.    Patient and Caregiver states clear understanding of the potential impact of substance use as it relates to transplant candidacy and is aware of possible random substance screening.  Substance abstinence/cessation counseling, education and resources provided and reviewed.     Arrests/DWI/Treatment/Rehab: patient denies    Psychiatric History:    Mental Health: Pt denies any past and/or present mental health symptoms and/or diagnoses.  Psychiatrist/Counselor: Pt denies ever receiving care from psychiatrist/counselor.  Medications:  Pt reports has been taking Wellbutrin for "a few years" (prescribed by neurologist) to help with " concentration.  Pt reports experiencing difficulty with concentration since suffering stroke ~ 15 years ago.  Suicide/Homicide Issues: Pt denies any past and/or present SI/HI.   Safety at home: Pt denies having any past or present concerns regarding safety at home including but not limited to physical/emotional/sexual abuse, neglect, or exploitation.    Knowledge: Patient and Caregiver states having clear understanding and realistic expectations regarding the potential risks and potential benefits of organ transplantation and organ donation, agrees to discuss with health care team members and support system members, and to utilize available resources and express questions and/or concerns in order to further facilitate the pt informed decision-making.  Resources and information provided and reviewed.     Patient and Caregiver is aware of [facility]'s affiliation and/or partnership with agencies in home health care, LTAC, SNF, [facility], and other hospitals and clinics.    Understanding: Patient and Caregiver reports having a clear understanding of the many lifetime commitments involved with being a transplant recipient, including costs, compliance, medications, lab work, procedures, appointments, concrete and financial planning, preparedness, timely and appropriate communication of concerns, abstinence (ETOH, tobacco, illicit non-prescribed drugs), adherence to all health care team recommendations, support system and caregiver involvement, appropriate and timely resource utilization and follow-through, mental health counseling as needed/recommended, and patient and caregiver responsibilities.  Social Service Handbook, resources and detailed educational information provided and reviewed.  Educational information provided.    Patient and Caregiver also reports current and expected compliance with health care regime and states having a clear understanding of the importance of compliance.      Patient and Caregiver reports a  clear understanding that risks and benefits may be involved with organ transplantation and with organ donation.      Patient and Caregiver also reports clear understanding that psychosocial risk factors may affect patient, and include but are not limited to feelings of depression, generalized anxiety, anxiety regarding dependence on others, post traumatic stress disorder, feelings of guilt and other emotional and/or mental concerns, and/or exacerbation of existing mental health concerns.  Detailed resources provided and discussed.     Patient and Caregiver agrees to access appropriate resources in a timely manner as needed and/or as recommended, and to communicate concerns appropriately.  Patient and Caregiver also reports a clear understanding of treatment options available.      reviewed education, provided additional information, and answered questions.    Feelings or Concerns: Pt expresses motivation to continue pursuing transplant and denies any transplant related concerns at this time.  SW had in-depth discussing with patient and family regarding importance of ongoing consideration and discussion of risks / benefits related to transplant surgery and post-transplant recovery with providers given pt's advanced age at time of initial transplant evaluation.  SW also discussed benefit of having living donors identified for this reason as well.    Coping: Identify Patient & Caregiver Strategies to [place]:   1. In the past - spending time with grandchildren (pt has 10 grandkids); travel; family support   2. Currently & Pre-transplant - all of the above   3. At the time of surgery - family support   4. During post-Transplant & Recovery Period - all of the above    Goals: Pt reports post-transplant goals are to continue enjoying time with grandchildren and avoiding dialysis.    Interview Behavior: Patient and Caregiver presents as alert and oriented x 4, pleasant, good eye contact, well groomed, recall good,  concentration/judgement good, average intelligence, calm, communicative, cooperative, and asking and answering questions appropriately.  Pt accompanied to clinic visit by wife and daughter who both present as highly attentive, highly supportive, and highly engaged in pt's health care and pursuit of transplant.         Transplant Social Work - Candidacy  Assessment/Plan:     Psychosocial Suitability: Based on psychosocial risk factors, patient presents as  low risk  candidate for kidney transplant at this time due to established caregiver plan, supportive family system, history of good adherence to nephrology plan of care, no reported history of illicit substance abuse and/or mental health concerns, healthy and effective coping strategies, adequate insurance coverage and personal finances to cover transplant cost, and realistic beliefs and expectations regarding risks and benefits of transplant.    Final determination of transplant candidacy to be made by Transplant Selection Committee.    Recommendations/Additional Comments: SW recommends pt establish local lodging plan for immediate post-transplant recovery period.  SW recommends that pt conduct fundraising to assist pt with pay for cost of medications, food, gas, and other transplant related needs.  SW recommends that pt remain aware of potential mental health concerns and contact the team if any concerns arise.  SW recommends that pt remain abstinent from tobacco, ETOH, and drug use.  SW supports pt's continued adherence. SW remains available to answer any questions or concerns that arise as the pt moves through the transplant process.     Avi Borden

## 2023-08-01 ENCOUNTER — PATIENT MESSAGE (OUTPATIENT)
Dept: TRANSPLANT | Facility: CLINIC | Age: 73
End: 2023-08-01
Payer: MEDICARE

## 2023-08-02 ENCOUNTER — PATIENT MESSAGE (OUTPATIENT)
Dept: TRANSPLANT | Facility: CLINIC | Age: 73
End: 2023-08-02
Payer: MEDICARE

## 2023-08-07 ENCOUNTER — TELEPHONE (OUTPATIENT)
Dept: TRANSPLANT | Facility: CLINIC | Age: 73
End: 2023-08-07
Payer: MEDICARE

## 2023-08-08 ENCOUNTER — TELEPHONE (OUTPATIENT)
Dept: TRANSPLANT | Facility: CLINIC | Age: 73
End: 2023-08-08
Payer: MEDICARE

## 2023-08-08 NOTE — TELEPHONE ENCOUNTER
----- Message from Sabina Collazo RN sent at 7/28/2023  3:11 PM CDT -----    ----- Message -----  From: Thuan Cervantes PA-C  Sent: 6/14/2023   4:20 PM CDT  To: Sabina Collazo RN, Megan Aguillon, NP    Hello,     Just an update for this patient.   The confirmatory FTA-Abs was negative. 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 The confirmatory would be positive if he ever had past or active syphilis infection. Therefore, No further action, such as lab monitoring, or abx treatment indicated or recommended.     If I can be of any further assistance, please feel free to reach back out.   Thank you again, and take care.    Osman Cervantes PA-C  Infectious Disease, [facility] ([facility])  Tel# [phone]  [Ext [phone]]    ----- Message -----  From: Sabina Collazo RN  Sent: 6/14/2023  12:40 PM CDT  To: Thuan Cervantes PA-C    RPR reactive  1:1   ID to manage

## 2023-09-07 ENCOUNTER — PATIENT MESSAGE (OUTPATIENT)
Dept: TRANSPLANT | Facility: CLINIC | Age: 73
End: 2023-09-07
Payer: MEDICARE

## 2023-09-12 ENCOUNTER — PATIENT MESSAGE (OUTPATIENT)
Dept: TRANSPLANT | Facility: CLINIC | Age: 73
End: 2023-09-12
Payer: MEDICARE

## 2023-10-02 ENCOUNTER — PATIENT MESSAGE (OUTPATIENT)
Dept: ADMINISTRATIVE | Facility: OTHER | Age: 73
End: 2023-10-02
Payer: MEDICARE

## 2023-10-17 ENCOUNTER — TELEPHONE (OUTPATIENT)
Dept: TRANSPLANT | Facility: CLINIC | Age: 73
End: 2023-10-17
Payer: MEDICARE

## 2023-10-18 ENCOUNTER — TELEPHONE (OUTPATIENT)
Dept: TRANSPLANT | Facility: CLINIC | Age: 73
End: 2023-10-18
Payer: MEDICARE

## 2023-10-19 ENCOUNTER — TELEPHONE (OUTPATIENT)
Dept: TRANSPLANT | Facility: CLINIC | Age: 73
End: 2023-10-19
Payer: MEDICARE

## 2023-10-19 NOTE — TELEPHONE ENCOUNTER
MA notes :    Have not received the compliance form for the pt. Pt goes to [facility]  ph [phone]     MA faxed compliance form twice and spoke to the nurse for the clinic , she said she would need to go through the faxes then she will fax it.  MA faxed compliance form again but have not received.     Sabina Valentine  Abdominal MA

## 2023-10-20 ENCOUNTER — COMMITTEE REVIEW (OUTPATIENT)
Dept: TRANSPLANT | Facility: CLINIC | Age: 73
End: 2023-10-20
Payer: MEDICARE

## 2023-10-20 NOTE — COMMITTEE REVIEW
Native Organ Dx: Diabetes Mellitus - Type I      Not approved for LRD/CAD transplant due to elevated PA pressures and worsening heart failure, needs updated Colonoscopy added to workup..  PA pressures needs to be 50 or below.       Note written by Olivia Cha RN    ===============================================    I was present at the meeting and attest to the general consensus of the committee.   Michael Estrada Jr.

## 2023-10-20 NOTE — LETTER
October 24, 2023    Peewee Aviles  [address] [facility] [facility] [address]    Dear Peewee Aviles:  MRN: [id number]    It is the duty of the [facility] Selection Committee to determine which patients are candidates for a transplant. For this reason, our committee has the difficult task of evaluating patients to determine which ones have the greatest chance of having a successful transplant. We are aware of the magnitude of this responsibility, and we approach it with reverence and humility.    It is with regret I inform you that you are not approved as a transplant candidate due to elevated PA pressures and worsening heart failure, Your PA pressure would need to be 50 or below.  Based on this review, we have determined that at this time, you are not a candidate for a transplant at [facility].      The selection committee carefully considers each patient's transplant candidacy and determines whether it is safe to proceed with transplantation on a case-by-case basis using established selection criteria.  At present, the risk of proceeding with an elective transplant surgery has become too high.                                                                               Although the selection committee believes you are not a suitable transplant candidate, you have the option to be evaluated at other transplant centers who may have different selection criteria.  You may request your [facility] records be sent to any center of your choice by contacting our Medical Records Department at [phone].                                                                               Attached is a letter from the [employer] for Organ Sharing (UNOS).  It describes the services and information offered to patients by UNOS and the Organ Procurement and Transplant Network.    The [facility] Selection Committee sincerely wishes you the best and remains available to answer any questions.  Please do not hesitate to contact  our pre-transplant office if we can assist you in any other way.                                                                               Sincerely,      Bev Singh MD  Medical Director, Kidney & Kidney/Pancreas Transplantation      Encl: UNOS Letter               The Organ Procurement and Transplantation Network   Toll-free patient services line: 5-570-423-4194  Your resource for organ transplant information      Staffed 8:30 am - 5:00 pm ET Monday - Friday   Leave a message 24/7 to receive a call back    The Organ Procurement and Transplantation Network (OPTN) is the national transplant system. It makes the policies that decide how donated organs are matched to patients waiting for a transplant. The OPTN:    Makes sure donated organs get matched to people on the transplant waiting list  Tells people about the donation and transplant processes  Makes sure that the public knows about the need for more organ and tissue donations    The OPTN has a free patient services line that you can call to:  Get more information about:   o Organ donation and organ transplants   o Donation and transplant policies  Get an information kit with:   o A list of transplant hospitals   o Waiting list information  Talk about any questions you may have about your transplant hospital or organ procurement organization. The staff will do their best to help you or point you to others who may help.  Find out how you can volunteer with the OPTN and help shape transplant policy    The patient services line number is: 9-074-726-2754    Patient services line staff CANNOT answer questions about your own medical care, including:  Waiting list status  Test results  Medical records  You will need to call your transplant hospital for this information.    The following websites have more information about transplantation and donation:  OPTN: https://optn.transplant.[facility].gov/  For potential living donors and transplant recipients:   o  Living with transplant: https://www.transplantliving.org/   o Living donation process: https://optn.transplant.hrsa.gov/living-donation/     o Financial assistance: https://www.livingdonorassistance.org/  Transplantation data: https://www.srtr.org/  Organ donation: https://www.organdonor.gov/    Volunteer with the OPTN: https://optn.transplant.hrsa.gov/get-involved